# Patient Record
Sex: FEMALE | Race: WHITE | URBAN - METROPOLITAN AREA
[De-identification: names, ages, dates, MRNs, and addresses within clinical notes are randomized per-mention and may not be internally consistent; named-entity substitution may affect disease eponyms.]

---

## 2017-04-20 ENCOUNTER — IMPORTED ENCOUNTER (OUTPATIENT)
Dept: URBAN - METROPOLITAN AREA CLINIC 50 | Facility: CLINIC | Age: 57
End: 2017-04-20

## 2018-08-03 ENCOUNTER — IMPORTED ENCOUNTER (OUTPATIENT)
Dept: URBAN - METROPOLITAN AREA CLINIC 50 | Facility: CLINIC | Age: 58
End: 2018-08-03

## 2018-08-06 ENCOUNTER — IMPORTED ENCOUNTER (OUTPATIENT)
Dept: URBAN - METROPOLITAN AREA CLINIC 50 | Facility: CLINIC | Age: 58
End: 2018-08-06

## 2018-08-06 NOTE — PATIENT DISCUSSION
"""Continue Valacyclovir 1000 mg PO three times a day for full 10 day course."" ""Start Cool compresses left eye BID - TID. "" ""Start Artificial tears both eyes two - four times a day.  """

## 2018-08-13 ENCOUNTER — IMPORTED ENCOUNTER (OUTPATIENT)
Dept: URBAN - METROPOLITAN AREA CLINIC 50 | Facility: CLINIC | Age: 58
End: 2018-08-13

## 2018-08-22 PROBLEM — B02.39: Noted: 2018-08-22

## 2018-08-22 PROBLEM — INACTIVE: Noted: 2018-08-22

## 2018-08-22 PROBLEM — INACTIVE: Status: INACTIVE | Noted: 2018-08-22

## 2018-08-23 ENCOUNTER — IMPORTED ENCOUNTER (OUTPATIENT)
Dept: URBAN - METROPOLITAN AREA CLINIC 50 | Facility: CLINIC | Age: 58
End: 2018-08-23

## 2019-12-30 ENCOUNTER — IMPORTED ENCOUNTER (OUTPATIENT)
Dept: URBAN - METROPOLITAN AREA CLINIC 50 | Facility: CLINIC | Age: 59
End: 2019-12-30

## 2020-12-31 ENCOUNTER — IMPORTED ENCOUNTER (OUTPATIENT)
Dept: URBAN - METROPOLITAN AREA CLINIC 50 | Facility: CLINIC | Age: 60
End: 2020-12-31

## 2021-05-14 ASSESSMENT — VISUAL ACUITY
OD_SC: 20/20
OD_CC: 20/25
OD_BAT: 20/20
OS_CC: 20/20
OS_CC: J1
OS_OTHER: 20/30. 20/40.
OS_SC: 20/20
OD_CC: J1@ 16 IN
OS_OTHER: 20/20. 20/25.
OD_CC: 20/25
OS_CC: J1+@ 16 IN
OS_CC: 20/20-
OD_CC: 20/20
OS_CC: J1@ 16 IN
OD_CC: 20/25
OS_CC: J1
OS_BAT: 20/30
OS_BAT: 20/20
OD_CC: J1
OS_SC: 20/20
OS_OTHER: 20/30. 20/40.
OS_CC: 20/25
OD_BAT: 20/30
OD_SC: 20/20
OS_CC: 20/20
OD_OTHER: 20/30. 20/40.
OD_CC: J1
OS_BAT: 20/30
OD_OTHER: 20/30. 20/40.
OD_BAT: 20/30
OD_OTHER: 20/20. 20/25.
OS_CC: 20/25
OD_CC: 20/20
OD_CC: J1+@ 16 IN

## 2021-05-14 ASSESSMENT — TONOMETRY
OD_IOP_MMHG: 18
OD_IOP_MMHG: 17
OS_IOP_MMHG: 18
OD_IOP_MMHG: 16
OD_IOP_MMHG: 16
OS_IOP_MMHG: 16
OS_IOP_MMHG: 16
OD_IOP_MMHG: 16
OD_IOP_MMHG: 17
OD_IOP_MMHG: 16
OS_IOP_MMHG: 17
OS_IOP_MMHG: 16
OS_IOP_MMHG: 18
OS_IOP_MMHG: 16

## 2021-12-15 ENCOUNTER — PREPPED CHART (OUTPATIENT)
Dept: URBAN - METROPOLITAN AREA CLINIC 53 | Facility: CLINIC | Age: 61
End: 2021-12-15

## 2021-12-16 ENCOUNTER — COMPREHENSIVE EXAM (OUTPATIENT)
Dept: URBAN - METROPOLITAN AREA CLINIC 53 | Facility: CLINIC | Age: 61
End: 2021-12-16

## 2021-12-16 DIAGNOSIS — Z01.01: ICD-10-CM

## 2021-12-16 DIAGNOSIS — H25.13: ICD-10-CM

## 2021-12-16 PROCEDURE — 92014 COMPRE OPH EXAM EST PT 1/>: CPT

## 2021-12-16 PROCEDURE — 92015 DETERMINE REFRACTIVE STATE: CPT

## 2021-12-16 ASSESSMENT — VISUAL ACUITY
OS_SC: 20/20
OD_SC: 20/20
OU_SC: J2@18"

## 2021-12-16 ASSESSMENT — TONOMETRY
OD_IOP_MMHG: 15
OS_IOP_MMHG: 15

## 2022-12-16 ENCOUNTER — COMPREHENSIVE EXAM (OUTPATIENT)
Dept: URBAN - METROPOLITAN AREA CLINIC 52 | Facility: CLINIC | Age: 62
End: 2022-12-16

## 2022-12-16 PROCEDURE — 92014 COMPRE OPH EXAM EST PT 1/>: CPT

## 2022-12-16 ASSESSMENT — VISUAL ACUITY
OD_GLARE: 20/25
OS_GLARE: 20/30
OD_GLARE: 20/30
OS_GLARE: 20/25
OU_SC: J1 @ 14"
OS_SC: 20/20
OD_SC: 20/20

## 2022-12-16 ASSESSMENT — TONOMETRY
OS_IOP_MMHG: 12
OD_IOP_MMHG: 14

## 2023-09-28 ENCOUNTER — COMPREHENSIVE EXAM (OUTPATIENT)
Dept: URBAN - METROPOLITAN AREA CLINIC 53 | Facility: CLINIC | Age: 63
End: 2023-09-28

## 2023-09-28 DIAGNOSIS — H35.373: ICD-10-CM

## 2023-09-28 DIAGNOSIS — Z01.01: ICD-10-CM

## 2023-09-28 DIAGNOSIS — H52.13: ICD-10-CM

## 2023-09-28 PROCEDURE — 92015 DETERMINE REFRACTIVE STATE: CPT

## 2023-09-28 PROCEDURE — 92134 CPTRZ OPH DX IMG PST SGM RTA: CPT

## 2023-09-28 PROCEDURE — 92014 COMPRE OPH EXAM EST PT 1/>: CPT

## 2023-09-28 ASSESSMENT — VISUAL ACUITY
OS_GLARE: 20/20
OS_GLARE: 20/20
OS_SC: 20/20-1
OD_GLARE: 20/20
OU_SC: J2@16"
OD_SC: 20/20-2
OD_GLARE: 20/20

## 2023-09-28 ASSESSMENT — TONOMETRY
OD_IOP_MMHG: 14
OS_IOP_MMHG: 13

## 2024-10-23 ENCOUNTER — COMPREHENSIVE EXAM (OUTPATIENT)
Dept: URBAN - METROPOLITAN AREA CLINIC 49 | Facility: LOCATION | Age: 64
End: 2024-10-23

## 2024-10-23 DIAGNOSIS — H25.13: ICD-10-CM

## 2024-10-23 DIAGNOSIS — H52.13: ICD-10-CM

## 2024-10-23 DIAGNOSIS — H43.823: ICD-10-CM

## 2024-10-23 DIAGNOSIS — H02.834: ICD-10-CM

## 2024-10-23 DIAGNOSIS — H02.831: ICD-10-CM

## 2024-10-23 PROCEDURE — 92015 DETERMINE REFRACTIVE STATE: CPT

## 2024-10-23 PROCEDURE — 92134 CPTRZ OPH DX IMG PST SGM RTA: CPT

## 2024-10-23 PROCEDURE — 99214 OFFICE O/P EST MOD 30 MIN: CPT
